# Patient Record
Sex: FEMALE | Race: WHITE | NOT HISPANIC OR LATINO | URBAN - METROPOLITAN AREA
[De-identification: names, ages, dates, MRNs, and addresses within clinical notes are randomized per-mention and may not be internally consistent; named-entity substitution may affect disease eponyms.]

---

## 2021-05-06 PROCEDURE — U0004 COV-19 TEST NON-CDC HGH THRU: HCPCS | Performed by: PHYSICIAN ASSISTANT

## 2021-05-07 ENCOUNTER — TELEPHONE (OUTPATIENT)
Dept: URGENT CARE | Facility: CLINIC | Age: 41
End: 2021-05-07

## 2021-05-07 NOTE — TELEPHONE ENCOUNTER
Pt called requesting COVID testing results, informed her of negative results. Pt voiced understanding.

## 2022-04-10 ENCOUNTER — TELEMEDICINE (OUTPATIENT)
Dept: FAMILY MEDICINE CLINIC | Facility: TELEHEALTH | Age: 42
End: 2022-04-10

## 2022-04-10 DIAGNOSIS — R09.81 NASAL CONGESTION: ICD-10-CM

## 2022-04-10 DIAGNOSIS — J06.9 UPPER RESPIRATORY TRACT INFECTION, UNSPECIFIED TYPE: Primary | ICD-10-CM

## 2022-04-10 PROCEDURE — 99213 OFFICE O/P EST LOW 20 MIN: CPT | Performed by: NURSE PRACTITIONER

## 2022-04-10 RX ORDER — FLUTICASONE PROPIONATE 50 MCG
2 SPRAY, SUSPENSION (ML) NASAL DAILY
Qty: 16 G | Refills: 0 | Status: SHIPPED | OUTPATIENT
Start: 2022-04-10

## 2022-04-10 RX ORDER — METHYLPREDNISOLONE 4 MG/1
TABLET ORAL
Qty: 21 TABLET | Refills: 0 | Status: SHIPPED | OUTPATIENT
Start: 2022-04-10

## 2022-04-10 NOTE — PROGRESS NOTES
You have chosen to receive care through a telehealth visit.  Do you consent to use a video/audio connection for your medical care today? Yes     CHIEF COMPLAINT  Chief Complaint   Patient presents with   • Nasal Congestion   • Sinus Problem         HPI  Terri Cordova is a 41 y.o. female  presents with complaint of nasal congestion, sinus pressure, runny nose and mild cough present for 4 days. Her phlegm is clear. She is using OTC sudafed for relief. She reports no fever, shortness of breath or wheezing.     Review of Systems   Constitutional: Negative for activity change, appetite change, chills, fatigue and fever.   HENT: Positive for congestion, postnasal drip, rhinorrhea and sinus pressure. Negative for sneezing and sore throat.    Respiratory: Positive for cough. Negative for shortness of breath, wheezing and stridor.    Musculoskeletal: Positive for neck pain.   Allergic/Immunologic: Negative.    Neurological: Positive for headaches.   Hematological: Negative.    Psychiatric/Behavioral: Negative.        Past Medical History:   Diagnosis Date   • Depression    • Narcolepsy        History reviewed. No pertinent family history.    Social History     Socioeconomic History   • Marital status: Single   Tobacco Use   • Smoking status: Current Every Day Smoker   • Smokeless tobacco: Never Used   Substance and Sexual Activity   • Alcohol use: No   • Drug use: Defer   • Sexual activity: Defer       Terri Cordova  reports that she has been smoking. She has never used smokeless tobacco..       There were no vitals taken for this visit.    PHYSICAL EXAM  Physical Exam   Constitutional: She is oriented to person, place, and time. She appears well-developed and well-nourished. She does not have a sickly appearance. She does not appear ill. No distress.   HENT:   Head: Normocephalic and atraumatic.   Right Ear: Hearing normal.   Left Ear: Hearing normal.   Nose: Mucosal edema, rhinorrhea and congestion present.    Mouth/Throat: Mouth/Lips are normal.  Pulmonary/Chest: Effort normal.  No respiratory distress.  Neurological: She is alert and oriented to person, place, and time.   Psychiatric: She has a normal mood and affect.   Vitals reviewed.      Results for orders placed or performed during the hospital encounter of 05/06/21   COVID-19 PCR, [facility], NP SWAB IN [facility] VIRAL TRANSPORT MEDIA 24-30 HR TAT - Swab, Nasopharynx    Specimen: Nasopharynx; Swab   Result Value Ref Range    SARS-CoV-2 ERMIAS Not Detected Not Detected   POCT Rapid Strep A    Specimen: Swab   Result Value Ref Range    Rapid Strep A Screen Negative Negative, VALID, INVALID, Not Performed    Internal Control Passed Passed    Lot Number [id number]     Expiration Date 11/19/22        Diagnoses and all orders for this visit:    1. Upper respiratory tract infection, unspecified type (Primary)  -     Ambulatory Referral to Family Practice    2. Nasal congestion  -     methylPREDNISolone (MEDROL) 4 MG dose pack; Take as directed on package instructions.  Dispense: 21 tablet; Refill: 0  -     fluticasone (Flonase) 50 MCG/ACT nasal spray; 2 sprays into the nostril(s) as directed by provider Daily.  Dispense: 16 g; Refill: 0  -     Ambulatory Referral to Family Practice    Increased fluids and rest.     Follow up in 7-10 days if no improvement, or worsening s/s.       FOLLOW-UP  As discussed during visit with PCP/[facility] if no improvement or Urgent Care/Emergency Department if worsening of symptoms    Patient verbalizes understanding of medication dosage, comfort measures, instructions for treatment and follow-up.    YOUSIF Mendosa  04/10/2022  11:49 EDT    This visit was performed via Telehealth.  This patient has been instructed to follow-up with their primary care provider if their symptoms worsen or the treatment provided does not resolve their illness.

## 2023-12-27 ENCOUNTER — OFFICE VISIT (OUTPATIENT)
Dept: ORTHOPEDIC SURGERY | Facility: CLINIC | Age: 43
End: 2023-12-27
Payer: COMMERCIAL

## 2023-12-27 VITALS
HEIGHT: 65 IN | DIASTOLIC BLOOD PRESSURE: 80 MMHG | BODY MASS INDEX: 18.23 KG/M2 | SYSTOLIC BLOOD PRESSURE: 128 MMHG | WEIGHT: 109.4 LBS

## 2023-12-27 DIAGNOSIS — S92.501A CLOSED FRACTURE OF PHALANX OF LESSER TOE OF RIGHT FOOT, PHYSEAL INVOLVEMENT UNSPECIFIED, UNSPECIFIED PHALANX, INITIAL ENCOUNTER: ICD-10-CM

## 2023-12-27 DIAGNOSIS — M79.671 RIGHT FOOT PAIN: Primary | ICD-10-CM

## 2023-12-27 NOTE — PATIENT INSTRUCTIONS
"Postop Shoe       Properly Fitting Comfortable Shoes    \"The patient is the best  of what constitutes a comfortable shoe.\"    The following are shoe characteristics that typically make a shoe comfortable to wear:    A Well-Fitted Shoe  Shoes not only need to be the correct length to fit our foot, but they also must be the correct width. Shoe manufacturers and shoe styles often vary the width significantly. Make sure that you wear shoes that not only fit the length of your foot, but also the width. Also, make sure the shoes have a comfortable fit at the heel and the toes.    Stiff Sole  A still sole minimizes excessive loading through the forefront or midfoot. The opposite of this would be a highly flexible sole (ex. A slipper) which tends to concentrate the pressure in certain areas of the foot.    Rocker Bottom Contour  A smooth, slightly rounded contour to the sole of the shoe will help to even out force through the foot as it moves from heel strike to toe off.    Wide Toebox  In general, the front of the shoe should have enough space to accommodate the front of the foot without requiring it to be squished.    Soft, Comfortable Uppers  The material making up the upper part of the shoe, particularly that covering the toes, and midfoot should be soft and somewhat flexible enough to stretch (ex. soft leather).    Soft, Comfortable Insert  The bed that the foot rests on should be soft and accomodate to the foot. It may be necessary to buy a comfortable \"over-the-counter\" orthotic to replace the existing shoe insert.     Figure 3: Rocker Bottom  (slightly rounded contour of the sole)         HANDOUT COURTESY OF [email or web address]  The information contained in this handout is copyrighted by www.[employer]com . It may be reproduced in small quantities for  non-commercial educational purposes. This information is provided only for general education. It is not intended to provide specific  medical advice. It is " expected that individuals will consult a qualified licensed healthcare provider to provide information that is  relevant to their specific condition. Edited by Guillaume Ward MD, Jas Crane MD, and Samir Browning MD     [employer]                  Online:  www.[employer]  [email or web address]/us/  [email or web address]    [place], [place]:   [employer]   [address], [address], [facility] [address]   [email or web address]      [employer]   [address], [facility], [address]   [email or web address]      [facility] [facility]   [address], [place] [address]   [email or web address]      [employer] [facility]   [address] [facility] [address], [address], [place] [address]   [email or web address]       Desensitization Exercises for CRPS  After an injury or surgery, it is common for an area to develop increased sensitivity. This may result in discomfort when everyday objects touch the area.   Desensitization is a treatment to decrease sensitivity by exposing the area to various textures and pressures. Once your incision is healed, you may begin exercises as recommended by your therapist.     Exercises   1. Rub the sensitive area with fabrics of various textures. Begin with softer fabrics and   progress to fabrics that are rougher.   Examples of fabrics:    Cotton balls    Cotton fabric    Velcro hook    Flannel    Daquan cloth    Velcro loop     2. Tap along the sensitive area using a small dowel, eraser or fingertips. Slowly increase the pressure. Tap the sensitive area into a pillow or cushion.     3. Roll the area along a dowel, or roll of putty, or use an empty deodorant, lip balm or perfume bottle with a roller tip and roll the ball over the area.     4. Using a small massager or electric appliance (shaver/toothbrush), massage along the sensitive area.     5. If your foot/ankle requires desensitization, place your foot/ankle in  a container filled with any of the following dry items:    Rolled oats    Rice       Sand    Dry beans     Perform each of the exercises for 10-15 seconds and progress to 1 minute. Perform every 2 hours during the day.     The more regularly this program is performed, the better the results.

## 2023-12-27 NOTE — PROGRESS NOTES
[facility] Orthopaedic Surgery Office Visit     Office Visit       Date: 12/27/2023   Patient Name: Terri Cordova  MRN: [id number]  YOB: 1980    Referring Physician: Freddie Gr APRN     Chief Complaint:   Chief Complaint   Patient presents with    Right Foot - Pain       History of Present Illness: Terri Cordova is a 43 y.o. female who is here today with right forefoot pain.  States dropped grocery bag with some heavy items onto her small toe on December 13.  Had immediate pain and swelling about the fifth digit.  Pain describes aching throbbing and shooting.  Has been elevating and icing which helps her condition worse with ambulation.  No previous surgery.  Works as a  .  Has continued to have pain.  Patient does smoke less than 5 cigarettes/day.    Subjective   Review of Systems: Review of Systems   Constitutional: Negative.    HENT: Negative.     Eyes: Negative.    Respiratory: Negative.     Cardiovascular: Negative.    Gastrointestinal: Negative.    Endocrine: Negative.    Genitourinary: Negative.    Musculoskeletal:  Positive for arthralgias.   Skin: Negative.    Allergic/Immunologic: Negative.    Neurological: Negative.    Hematological: Negative.    Psychiatric/Behavioral: Negative.          Past Medical History:   Past Medical History:   Diagnosis Date    Depression     Narcolepsy        Past Surgical History:   Past Surgical History:   Procedure Laterality Date    TONSILLECTOMY         Family History: History reviewed. No pertinent family history.    Social History:   Social History     Socioeconomic History    Marital status: Single   Tobacco Use    Smoking status: Every Day     Packs/day: .25     Types: Cigarettes     Start date: 2000    Smokeless tobacco: Never   Vaping Use    Vaping Use: Never used   Substance and Sexual Activity    Alcohol use: Yes    Drug use: Never    Sexual activity: Defer       Medications:   Current Outpatient  "Medications:     amphetamine-dextroamphetamine (ADDERALL) 20 MG tablet, Take 1 tablet by mouth 3 (Three) Times a Day., Disp: , Rfl:     armodafinil (NUVIGIL) 150 MG tablet, Take 1 tablet by mouth Daily., Disp: , Rfl:     fluticasone (Flonase) 50 MCG/ACT nasal spray, 2 sprays into the nostril(s) as directed by provider Daily., Disp: 16 g, Rfl: 0    Pitolisant HCl 17.8 MG tablet, Take 2 tablets by mouth Daily., Disp: , Rfl:     sertraline (ZOLOFT) 50 MG tablet, TAKE 1 TABLET BY MOUTH THREE TIMES A DAY, Disp: , Rfl:     Allergies:   Allergies   Allergen Reactions    Nsaids Anaphylaxis    Shellfish-Derived Products Anaphylaxis       I reviewed the patient's chief complaint, history of present illness, review of systems, past medical history, surgical history, family history, social history, medications and allergy list.     Objective    Vital Signs:   Vitals:    12/27/23 0810   BP: 128/80   Weight: 49.6 kg (109 lb 6.4 oz)   Height: 165.1 cm (65\")     Body mass index is 18.21 kg/m².    Ortho Exam:  right LE Foot and Ankle Exam:   There is good perfusion to the toes.   The skin is intact throughout the foot and ankle without ulceration.   Range of motion of ankle, subtalar joint, midfoot and toes is within normal limits.   There is tenderness to palpation about the fifth digit which is moderately diffusely swollen.  There is a about centimeter by centimeter in diameter blood blister beneath the dorsal skin skin however there is no skin compromise or open wounds.  Anatomic alignment of fifth digit.    Results Review:   Imaging Results (Last 24 Hours)       Procedure Component Value Units Date/Time    XR Foot 3+ View Right [[id number]] Resulted: 12/27/23 0855     Updated: 12/27/23 0856    Narrative:      Right Foot X-Ray 12/27/23   Indication: Pain  Views: 3 weight bearing , comparison to previous  Findings: xrays reviewed by me today in the office and show   redemonstration comminuted fractures of the middle and distal " phalanx of   the small toe              Assessment / Plan    Assessment/Plan:   Diagnoses and all orders for this visit:    1. Right foot pain (Primary)  -     XR Foot 3+ View Right    2. Closed fracture of phalanx of lesser toe of right foot, physeal involvement unspecified, unspecified phalanx, initial encounter      Discussed fracture (an injury with risk of long term functional impairment) with patient as well as treatment options at length. Decision regarding surgical intervention considered. Patient is not a candidate due to trial of nonoperative management. Also reviewed external note and imaging studies from December 14. Plan for non-operative management in a CAM walking boot.  Patient can continue weightbearing as tolerated in cam walking boot transitioning into a normal shoe as her symptoms allow.  Also provided patient with education on sebas taping.  Also provided patient with education on desensitization exercises to help prevent long-term neurologic pain.  Counseled patient to avoid any activities or shoewear that cause pain at her fracture site.  Will plan to see patient back in 6 weeks for repeat x-rays and reevaluation.    Discussed with patient that a small percentage of patients with this injury go on to develop a malunion/nonunion. The likely recommended surgical procedure would be an ORIF with augmentation if that were to happen.  The risks of such a procedure would include but are not limited to infection, neurovascular damage, hardware failure, stiffness, etc.         Follow Up:   Return in about 6 weeks (around 2/7/2024).      Dusty Naidu MD  [facility]

## 2023-12-28 PROBLEM — S92.501A CLOSED FRACTURE OF PHALANX OF LESSER TOE OF RIGHT FOOT: Status: ACTIVE | Noted: 2023-12-28

## 2024-02-02 ENCOUNTER — E-VISIT (OUTPATIENT)
Dept: ADMINISTRATIVE | Facility: OTHER | Age: 44
End: 2024-02-02
Payer: COMMERCIAL

## 2024-02-02 NOTE — E-VISIT ESCALATED
Chief Complaint: Mouth sores   Patient was shown the following escalation message:   Some conditions need a visit with a healthcare provider as soon as possible   Symptoms so painful that they affect your ability to talk, eat, or drink may suggest a more complicated condition. Make an appointment to be seen in the next 24 hours.   ----------   Patient Interview Transcript:   Are your symptoms on the inside or the outside of your mouth? - Inside includes your tongue, inner cheek or lips, gums, or roof of mouth - Outside includes your lips, skin surrounding lips, or corners of mouth Select all that apply.    Inside   Not selected:    Outside   Which areas inside your mouth are affected? Select all that apply.    Gums   Not selected:    Tongue    Inner cheeks or inner lips    Roof of my mouth    Floor of my mouth (below the tongue)    Throat    Teeth   Which of these best describe the symptoms inside your mouth? Select all that apply.    Pain or tenderness    Swelling   Not selected:    A cottony feeling    Blisters filled with fluid    Bumps or lumps    Burning    Cracking    Itching    Painful sores (or ulcers)    Painless sores (or ulcers)    Red patches    White patches    Other (specify)   How long have you had these symptoms? Select one.    1 to 3 days   Not selected:    4 to 6 days    7 to 10 days    More than 10 days   How many mouth lesions, sores, or bumps do you have? Select one.    1   Not selected:    2 to 4    5 to 10    More than 10   Mouth sores can interfere with talking, eating, or drinking. How much does the mouth sore affect your ability to do these things? Select one.    It makes talking, eating, or drinking impossible   Not selected:    Not at all    Somewhat, but I can still talk, eat, and drink   ----------   Medical history   Medical history data does not currently exist for this patient.

## 2024-07-18 ENCOUNTER — TELEMEDICINE (OUTPATIENT)
Dept: FAMILY MEDICINE CLINIC | Facility: TELEHEALTH | Age: 44
End: 2024-07-18
Payer: COMMERCIAL

## 2024-07-18 DIAGNOSIS — R22.0 JAW SWELLING: ICD-10-CM

## 2024-07-18 DIAGNOSIS — S02.5XXB OPEN FRACTURE OF TOOTH, INITIAL ENCOUNTER: Primary | ICD-10-CM

## 2024-07-18 DIAGNOSIS — K13.79 ORAL PAIN: ICD-10-CM

## 2024-07-18 RX ORDER — AMOXICILLIN AND CLAVULANATE POTASSIUM 875; 125 MG/1; MG/1
1 TABLET, FILM COATED ORAL 2 TIMES DAILY
Qty: 20 TABLET | Refills: 0 | Status: SHIPPED | OUTPATIENT
Start: 2024-07-18

## 2024-07-18 RX ORDER — FLUCONAZOLE 150 MG/1
150 TABLET ORAL ONCE
Qty: 1 TABLET | Refills: 0 | Status: SHIPPED | OUTPATIENT
Start: 2024-07-18 | End: 2024-07-18

## 2024-07-18 NOTE — PROGRESS NOTES
You have chosen to receive care through a telehealth visit.  Do you consent to use a video/audio connection for your medical care today? Yes     ANJUM Cordova is a 43 y.o. female  presents with complaint of right sided jaw swelling and dental/gum pain present for 2 days. She has a broken tooth near the area of pain that has been present for 2 months. She also was flossing and thinks she may of been over zealous cutting her gum. She reports the pain is intense and she is taking tylenol and using cool compresses for swelling. She does plan to get this seen by a dentist asap. She reports no fever.     Review of Systems   Constitutional: Negative.    HENT:  Positive for dental problem. Negative for mouth sores.    Psychiatric/Behavioral: Negative.         Past Medical History:   Diagnosis Date    Depression     Narcolepsy        No family history on file.    Social History     Socioeconomic History    Marital status: Single   Tobacco Use    Smoking status: Every Day     Current packs/day: 0.25     Average packs/day: 0.3 packs/day for 24.5 years (6.1 ttl pk-yrs)     Types: Cigarettes     Start date: 2000    Smokeless tobacco: Never   Vaping Use    Vaping status: Never Used   Substance and Sexual Activity    Alcohol use: Yes    Drug use: Never    Sexual activity: Defer         There were no vitals taken for this visit.    PHYSICAL EXAM  Physical Exam   Constitutional: She is oriented to person, place, and time. She appears well-developed and well-nourished. She does not have a sickly appearance. She does not appear ill. No distress.   HENT:   Head: Normocephalic and atraumatic.   Right Ear: Hearing normal. No drainage, swelling or tenderness.   Left Ear: Hearing normal. No drainage, swelling or tenderness.   Nose: Nose normal.   Mouth/Throat: Mouth/Lips are normal.Abnormal dentition.   Pulmonary/Chest: Effort normal.   Lymphadenopathy:     She has no cervical adenopathy.   Neurological: She is alert and oriented to  person, place, and time.   Psychiatric: She has a normal mood and affect.   Vitals reviewed.      Diagnoses and all orders for this visit:    1. Open fracture of tooth, initial encounter (Primary)  -     amoxicillin-clavulanate (AUGMENTIN) 875-125 MG per tablet; Take 1 tablet by mouth 2 (Two) Times a Day.  Dispense: 20 tablet; Refill: 0  -     fluconazole (Diflucan) 150 MG tablet; Take 1 tablet by mouth 1 (One) Time for 1 dose.  Dispense: 1 tablet; Refill: 0    2. Jaw swelling    3. Oral pain    Ice to right jaw every 3-4 hours for 20 minutes and prn.   Tylenol as directed prn pain.   Warm salt water gargles prn.   Schedule dental evaluation asap.       FOLLOW-UP  As discussed during visit with [facility], if symptoms worsen or fail to improve, follow-up with PCP/Urgent Care/Emergency Department.    Patient verbalizes understanding of medications, instructions for treatment and follow-up.    Kristin Terry, YOUSIF  07/18/2024  10:56 EDT    The use of a video visit has been reviewed with the patient and verbal informed consent has been obtained. Myself and Terri Cordova participated in this visit. The patient is located in [facility], and I am located in [place], [place]. [employer]iSyn[employer] and SparkupReader were utilized.

## 2024-09-14 ENCOUNTER — TELEMEDICINE (OUTPATIENT)
Dept: FAMILY MEDICINE CLINIC | Facility: TELEHEALTH | Age: 44
End: 2024-09-14
Payer: COMMERCIAL

## 2024-09-14 DIAGNOSIS — K04.7 DENTAL ABSCESS: Primary | ICD-10-CM

## 2024-09-14 RX ORDER — PREDNISONE 10 MG/1
TABLET ORAL
Qty: 21 TABLET | Refills: 0 | Status: SHIPPED | OUTPATIENT
Start: 2024-09-14

## 2024-09-14 NOTE — PROGRESS NOTES
You have chosen to receive care through a telehealth visit.  Do you consent to use a video/audio connection for your medical care today? Yes     CHIEF COMPLAINT  No chief complaint on file.        HPI  Terri Cordova is a 44 y.o. female  presents with complaint of broken tooth on right lower side and now has swelling of jaw and right side of lip.  Denies fever.  Painful chewing.     Review of Systems  See HPI    Past Medical History:   Diagnosis Date    Depression     Narcolepsy        No family history on file.    Social History     Socioeconomic History    Marital status: Single   Tobacco Use    Smoking status: Every Day     Current packs/day: 0.25     Average packs/day: 0.3 packs/day for 24.7 years (6.2 ttl pk-yrs)     Types: Cigarettes     Start date: 2000    Smokeless tobacco: Never   Vaping Use    Vaping status: Never Used   Substance and Sexual Activity    Alcohol use: Yes    Drug use: Never    Sexual activity: Defer       Terri Cordova  reports that she has been smoking cigarettes. She started smoking about 24 years ago. She has a 6.2 pack-year smoking history. She has never used smokeless tobacco.               There were no vitals taken for this visit.    PHYSICAL EXAM  Physical Exam   Constitutional: She is oriented to person, place, and time. She appears well-developed and well-nourished. She does not have a sickly appearance. She does not appear ill.   HENT:   Head: Normocephalic and atraumatic.   Moderate swelling of right lower lip and right lower jaw area   Pulmonary/Chest: Effort normal.  No respiratory distress.  Neurological: She is alert and oriented to person, place, and time.       Results for orders placed or performed during the hospital encounter of 05/06/21   COVID-19 PCR, [facility], NP SWAB IN [facility] VIRAL TRANSPORT MEDIA 24-30 HR TAT - Swab, Nasopharynx    Specimen: Nasopharynx; Swab   Result Value Ref Range    SARS-CoV-2 ERMIAS Not Detected Not Detected   POCT Rapid Strep A    Specimen:  Swab   Result Value Ref Range    Rapid Strep A Screen Negative Negative, VALID, INVALID, Not Performed    Internal Control Passed Passed    Lot Number [id number]     Expiration Date 11/19/22        Diagnoses and all orders for this visit:    1. Dental abscess (Primary)  -     amoxicillin-clavulanate (AUGMENTIN) 875-125 MG per tablet; Take 1 tablet by mouth 2 (Two) Times a Day for 10 days.  Dispense: 20 tablet; Refill: 0  -     predniSONE (DELTASONE) 10 MG (21) dose pack; Use as directed on package  Dispense: 21 tablet; Refill: 0    --take medications as prescribed  --increase fluids, rest as needed, tylenol or ibuprofen for pain  --f/u in 5-7 days with dentist if no improvement        FOLLOW-UP  As discussed during visit with PCP/[facility] if no improvement or Urgent Care/Emergency Department if worsening of symptoms    Patient verbalizes understanding of medication dosage, comfort measures, instructions for treatment and follow-up.    Adelina Monet, APRN  09/14/2024  08:45 EDT    The use of a video visit has been reviewed with the patient and verbal informed consent has been obtained. Myself and Terri Cordova participated in this visit. The patient is located in [address] [place] [address].    I am located in [place], [place]. [employer] and [employer] were utilized. I spent 8 minutes in the patient's chart for this visit.      Note Disclaimer: At [facility], we believe that sharing information builds trust and better   relationships. You are receiving this note because you recently visited [facility]. It is possible you   will see health information before a provider has talked with you about it. This kind of information can   be easy to misunderstand. To help you fully understand what it means for your health, we urge you to   discuss this note with your provider.